# Patient Record
Sex: MALE | Race: WHITE | NOT HISPANIC OR LATINO | Employment: OTHER | URBAN - METROPOLITAN AREA
[De-identification: names, ages, dates, MRNs, and addresses within clinical notes are randomized per-mention and may not be internally consistent; named-entity substitution may affect disease eponyms.]

---

## 2023-04-19 ENCOUNTER — OFFICE VISIT (OUTPATIENT)
Dept: PRIMARY CARE | Facility: CLINIC | Age: 63
End: 2023-04-19
Payer: COMMERCIAL

## 2023-04-19 VITALS
DIASTOLIC BLOOD PRESSURE: 99 MMHG | OXYGEN SATURATION: 95 % | SYSTOLIC BLOOD PRESSURE: 169 MMHG | HEART RATE: 72 BPM | WEIGHT: 247.4 LBS | BODY MASS INDEX: 34.51 KG/M2

## 2023-04-19 DIAGNOSIS — I10 HYPERTENSION, UNSPECIFIED TYPE: ICD-10-CM

## 2023-04-19 DIAGNOSIS — E78.5 HYPERLIPIDEMIA, UNSPECIFIED HYPERLIPIDEMIA TYPE: ICD-10-CM

## 2023-04-19 DIAGNOSIS — E55.9 VITAMIN D DEFICIENCY: ICD-10-CM

## 2023-04-19 DIAGNOSIS — E11.9 TYPE 2 DIABETES MELLITUS WITHOUT COMPLICATION, WITHOUT LONG-TERM CURRENT USE OF INSULIN (MULTI): Primary | ICD-10-CM

## 2023-04-19 DIAGNOSIS — N18.30 STAGE 3 CHRONIC KIDNEY DISEASE, UNSPECIFIED WHETHER STAGE 3A OR 3B CKD (MULTI): ICD-10-CM

## 2023-04-19 PROCEDURE — 3080F DIAST BP >= 90 MM HG: CPT | Performed by: INTERNAL MEDICINE

## 2023-04-19 PROCEDURE — 3077F SYST BP >= 140 MM HG: CPT | Performed by: INTERNAL MEDICINE

## 2023-04-19 PROCEDURE — 99214 OFFICE O/P EST MOD 30 MIN: CPT | Performed by: INTERNAL MEDICINE

## 2023-04-19 PROCEDURE — 4010F ACE/ARB THERAPY RXD/TAKEN: CPT | Performed by: INTERNAL MEDICINE

## 2023-04-19 RX ORDER — LISINOPRIL 40 MG/1
40 TABLET ORAL DAILY
Qty: 90 TABLET | Refills: 3 | Status: SHIPPED | OUTPATIENT
Start: 2023-04-19 | End: 2023-10-09 | Stop reason: SDUPTHER

## 2023-04-19 RX ORDER — METOPROLOL TARTRATE 50 MG/1
TABLET ORAL
Qty: 135 TABLET | Refills: 3 | Status: SHIPPED | OUTPATIENT
Start: 2023-04-19 | End: 2023-09-25 | Stop reason: SDUPTHER

## 2023-04-19 RX ORDER — GLIPIZIDE 5 MG/1
5 TABLET, FILM COATED, EXTENDED RELEASE ORAL
Qty: 90 TABLET | Refills: 3 | Status: SHIPPED | OUTPATIENT
Start: 2023-04-19 | End: 2023-10-09 | Stop reason: SDUPTHER

## 2023-04-19 RX ORDER — OMEPRAZOLE 40 MG/1
1 CAPSULE, DELAYED RELEASE ORAL DAILY
COMMUNITY
Start: 2015-11-19 | End: 2023-09-25 | Stop reason: SDUPTHER

## 2023-04-19 RX ORDER — ROSUVASTATIN CALCIUM 10 MG/1
10 TABLET, COATED ORAL DAILY
COMMUNITY
Start: 2016-11-22 | End: 2023-05-22 | Stop reason: SDUPTHER

## 2023-04-19 RX ORDER — METFORMIN HYDROCHLORIDE 1000 MG/1
1000 TABLET ORAL
COMMUNITY
Start: 2015-11-19 | End: 2023-05-22 | Stop reason: SDUPTHER

## 2023-04-19 RX ORDER — GLIPIZIDE 5 MG/1
5 TABLET, FILM COATED, EXTENDED RELEASE ORAL
COMMUNITY
Start: 2020-12-07 | End: 2023-04-19 | Stop reason: SDUPTHER

## 2023-04-19 RX ORDER — GLIPIZIDE 5 MG/1
5 TABLET, FILM COATED, EXTENDED RELEASE ORAL
Qty: 90 TABLET | Refills: 3 | Status: SHIPPED | OUTPATIENT
Start: 2023-04-19 | End: 2023-04-19 | Stop reason: SDUPTHER

## 2023-04-19 RX ORDER — DOXAZOSIN 2 MG/1
2 TABLET ORAL DAILY
Qty: 90 TABLET | Refills: 1 | Status: SHIPPED | OUTPATIENT
Start: 2023-04-19 | End: 2023-10-09 | Stop reason: SDUPTHER

## 2023-04-19 RX ORDER — METOPROLOL TARTRATE 50 MG/1
TABLET ORAL
Qty: 135 TABLET | Refills: 3 | Status: SHIPPED | OUTPATIENT
Start: 2023-04-19 | End: 2023-04-19 | Stop reason: SDUPTHER

## 2023-04-19 RX ORDER — LISINOPRIL 40 MG/1
40 TABLET ORAL DAILY
Qty: 90 TABLET | Refills: 3 | Status: SHIPPED | OUTPATIENT
Start: 2023-04-19 | End: 2023-04-19 | Stop reason: SDUPTHER

## 2023-04-19 NOTE — PROGRESS NOTES
Subjective   Patient ID: Luc Dietz is a 62 y.o. male who presents for the following    Assessment/Plan   HTN: added doxazosin today 2mg patient may increase to 4mg if not at goal of 130/80 this weekend  Continue on lisinopril 40mg daily   Continue on metoprolol 50mg bid    dm2: stable, a1c 6.5, glipizide 5mg XL daily (stop jardiance due price) and continue on metformin 1000mgbid.      CKD stage 3: avoid nsaids, stay hydrated. Cr. 1.6 typically stable (once patient is on medicare consider farxiga)      HLD: LDL < 70. check lipid panel      Order Cologuard 2021 negative,     PSA is in the spring with me     HPI  male with type 2 diabetes, diabetic nephropathy, hx kidney stones followed by Dr. Spencer, hypertension, hyperlipidemia, nephrolithiasis, renal insufficiency comes for the following      Follow Up     HTN: patient denies any headaches, blurred vision or dizziness. patient denies any stroke like symptoms     HLD: Patient denies any muscle aches and is tolerating statin therapy     Diabetes Type 2: patient denies any low blood sugars. Patient is following with opthalmology on a yearly basis. 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 speech is clear and coherent  HEENT: Sclera and conjunctiva white, EOMI, uvela midline, no mouth lesions. PERRLA,      Respiratory : No respiratory distress, normal respiratory rhythm and effort. Clear bilateral breath sounds. No wheezing or rhonchi.   Cardiovascular: heart rate regular, S1, S2. no murmurs. no Lower extremity edema  Skin inspection: Normal skin color and pigmentation,  normal skin turgor and no visible rash, induration, or cellulitis  MSK: 5/5 strength upper and lower extremities without gait abnormalities. no loss of muscle mass   Neuro: 2-12 CN grossly intact.  no slurred speech. no lateralizing deficit  Psychiatric Orientation: Oriented to person, place, and time. no depression, homicidal or suicidal thoughts, normal affect     REVIEW OF SYSTEMS   Constitutional: not feeling tired and no fever, chills or sweats. Denies weight loss    HEENT: no earache and no sore throat. no blurred vision and or double vision. no headache  Cardiovascular: no exertional chest pain, no palpitations, no lower extremity edema and no intermittent leg claudication.   Lungs: Denies shortness of breath, exertional dyspnea, wheezing  Gastrointestinal: no change in bowel habits, no diarrhea, no nausea, no vomiting and no abdominal pain. Denies Melena, brbpr or dark stool  Musculoskeletal: no myalgias, no muscle weakness and no limb swelling.   Skin: no rashes, no change in skin color and pigmentation and no skin lumps.   Neurological: no headaches, no seizures, no numbness, no lateralizing deficits and no fainting.   Psychiatric: no depression and no anxiety.   Urine: denies polyuria, hematuria, dysuria   Endocrine: no cold intolerance, no heat intolerance      No Known Allergies    No current outpatient medications on file.     No current facility-administered medications for this visit.       Objective     No visits with results within 4 Month(s) from this visit.   Latest known visit with results is:   Legacy Encounter on 12/21/2020   Component Date Value Ref Range Status    Glucose 12/21/2020 172 (H)  74 - 99 mg/dL Final    Sodium 12/21/2020 141  136 - 145 mmol/L Final    Potassium 12/21/2020 5.2  3.5 - 5.3 mmol/L Final    Chloride 12/21/2020 104  98 - 107 mmol/L Final    Bicarbonate 12/21/2020 25  21 - 32 mmol/L Final    Anion Gap 12/21/2020 17  10 - 20 mmol/L Final    Urea Nitrogen 12/21/2020 28 (H)   6 - 23 mg/dL Final    Creatinine 12/21/2020 1.65 (H)  0.50 - 1.30 mg/dL Final    GLOMERULAR FILTRATION RATE-NON AFR* 12/21/2020 42 (A)  >60 mL/min/1.73m2 Final    GLOMERULAR FILTRATION RATE-* 12/21/2020 51 (A)  >60 mL/min/1.73m2 Final    Calcium 12/21/2020 9.6  8.6 - 10.6 mg/dL Final       Radiology: Reviewed imaging in powerchart.  No results found.    No family history on file.  Social History     Socioeconomic History    Marital status:      Spouse name: Not on file    Number of children: Not on file    Years of education: Not on file    Highest education level: Not on file   Occupational History    Not on file   Tobacco Use    Smoking status: Not on file    Smokeless tobacco: Not on file   Vaping Use    Vaping status: Not on file   Substance and Sexual Activity    Alcohol use: Not on file    Drug use: Not on file    Sexual activity: Not on file   Other Topics Concern    Not on file   Social History Narrative    Not on file     Social Determinants of Health     Financial Resource Strain: Not on file   Food Insecurity: Not on file   Transportation Needs: Not on file   Physical Activity: Not on file   Stress: Not on file   Social Connections: Not on file   Intimate Partner Violence: Not on file   Housing Stability: Not on file     Past Medical History:   Diagnosis Date    Personal history of other endocrine, nutritional and metabolic disease 11/03/2013    History of hyperlipidemia    Personal history of other endocrine, nutritional and metabolic disease     History of diabetes mellitus    Personal history of urinary calculi     Personal history of renal calculi     Past Surgical History:   Procedure Laterality Date    LITHOTRIPSY  05/19/2014    Renal Lithotripsy    OTHER SURGICAL HISTORY  12/02/2019    Colonoscopy    OTHER SURGICAL HISTORY  05/19/2014    Surgery Scrotum       Charting was completed using voice recognition technology and may include unintended errors.

## 2023-05-22 DIAGNOSIS — E11.9 TYPE 2 DIABETES MELLITUS WITHOUT COMPLICATION, WITHOUT LONG-TERM CURRENT USE OF INSULIN (MULTI): ICD-10-CM

## 2023-05-22 DIAGNOSIS — E78.5 HYPERLIPIDEMIA, UNSPECIFIED HYPERLIPIDEMIA TYPE: ICD-10-CM

## 2023-05-22 RX ORDER — METFORMIN HYDROCHLORIDE 1000 MG/1
1000 TABLET ORAL
Qty: 180 TABLET | Refills: 1 | Status: SHIPPED | OUTPATIENT
Start: 2023-05-22 | End: 2023-09-25 | Stop reason: SDUPTHER

## 2023-05-22 RX ORDER — ROSUVASTATIN CALCIUM 10 MG/1
10 TABLET, COATED ORAL DAILY
Qty: 90 TABLET | Refills: 1 | Status: SHIPPED | OUTPATIENT
Start: 2023-05-22 | End: 2023-09-25 | Stop reason: SDUPTHER

## 2023-09-25 DIAGNOSIS — I10 HYPERTENSION, UNSPECIFIED TYPE: ICD-10-CM

## 2023-09-25 DIAGNOSIS — N18.30 STAGE 3 CHRONIC KIDNEY DISEASE, UNSPECIFIED WHETHER STAGE 3A OR 3B CKD (MULTI): ICD-10-CM

## 2023-09-25 DIAGNOSIS — K21.9 GASTROESOPHAGEAL REFLUX DISEASE, UNSPECIFIED WHETHER ESOPHAGITIS PRESENT: ICD-10-CM

## 2023-09-25 DIAGNOSIS — E78.5 HYPERLIPIDEMIA, UNSPECIFIED HYPERLIPIDEMIA TYPE: ICD-10-CM

## 2023-09-25 DIAGNOSIS — E11.9 TYPE 2 DIABETES MELLITUS WITHOUT COMPLICATION, WITHOUT LONG-TERM CURRENT USE OF INSULIN (MULTI): ICD-10-CM

## 2023-09-25 RX ORDER — METFORMIN HYDROCHLORIDE 1000 MG/1
1000 TABLET ORAL
Qty: 180 TABLET | Refills: 1 | Status: SHIPPED | OUTPATIENT
Start: 2023-09-25 | End: 2024-04-03 | Stop reason: SDUPTHER

## 2023-09-25 RX ORDER — OMEPRAZOLE 40 MG/1
40 CAPSULE, DELAYED RELEASE ORAL DAILY
Qty: 90 CAPSULE | Refills: 3 | Status: SHIPPED | OUTPATIENT
Start: 2023-09-25

## 2023-09-25 RX ORDER — METOPROLOL TARTRATE 50 MG/1
TABLET ORAL
Qty: 135 TABLET | Refills: 3 | Status: SHIPPED | OUTPATIENT
Start: 2023-09-25

## 2023-09-25 RX ORDER — ROSUVASTATIN CALCIUM 10 MG/1
10 TABLET, COATED ORAL DAILY
Qty: 90 TABLET | Refills: 1 | Status: SHIPPED | OUTPATIENT
Start: 2023-09-25 | End: 2024-04-03 | Stop reason: SDUPTHER

## 2023-10-09 ENCOUNTER — OFFICE VISIT (OUTPATIENT)
Dept: PRIMARY CARE | Facility: CLINIC | Age: 63
End: 2023-10-09
Payer: MEDICARE

## 2023-10-09 VITALS
HEIGHT: 71 IN | OXYGEN SATURATION: 96 % | BODY MASS INDEX: 36.68 KG/M2 | HEART RATE: 60 BPM | SYSTOLIC BLOOD PRESSURE: 144 MMHG | WEIGHT: 262 LBS | DIASTOLIC BLOOD PRESSURE: 82 MMHG

## 2023-10-09 DIAGNOSIS — E78.5 HYPERLIPIDEMIA, UNSPECIFIED HYPERLIPIDEMIA TYPE: ICD-10-CM

## 2023-10-09 DIAGNOSIS — Z00.00 WELLNESS EXAMINATION: Primary | ICD-10-CM

## 2023-10-09 DIAGNOSIS — E55.9 VITAMIN D DEFICIENCY: ICD-10-CM

## 2023-10-09 DIAGNOSIS — I10 HYPERTENSION, UNSPECIFIED TYPE: ICD-10-CM

## 2023-10-09 DIAGNOSIS — N18.30 STAGE 3 CHRONIC KIDNEY DISEASE, UNSPECIFIED WHETHER STAGE 3A OR 3B CKD (MULTI): ICD-10-CM

## 2023-10-09 DIAGNOSIS — E11.9 TYPE 2 DIABETES MELLITUS WITHOUT COMPLICATION, WITHOUT LONG-TERM CURRENT USE OF INSULIN (MULTI): ICD-10-CM

## 2023-10-09 PROCEDURE — 3079F DIAST BP 80-89 MM HG: CPT | Performed by: INTERNAL MEDICINE

## 2023-10-09 PROCEDURE — 1160F RVW MEDS BY RX/DR IN RCRD: CPT | Performed by: INTERNAL MEDICINE

## 2023-10-09 PROCEDURE — 1159F MED LIST DOCD IN RCRD: CPT | Performed by: INTERNAL MEDICINE

## 2023-10-09 PROCEDURE — 1036F TOBACCO NON-USER: CPT | Performed by: INTERNAL MEDICINE

## 2023-10-09 PROCEDURE — 1170F FXNL STATUS ASSESSED: CPT | Performed by: INTERNAL MEDICINE

## 2023-10-09 PROCEDURE — 4010F ACE/ARB THERAPY RXD/TAKEN: CPT | Performed by: INTERNAL MEDICINE

## 2023-10-09 PROCEDURE — 99214 OFFICE O/P EST MOD 30 MIN: CPT | Performed by: INTERNAL MEDICINE

## 2023-10-09 PROCEDURE — 3077F SYST BP >= 140 MM HG: CPT | Performed by: INTERNAL MEDICINE

## 2023-10-09 PROCEDURE — G0402 INITIAL PREVENTIVE EXAM: HCPCS | Performed by: INTERNAL MEDICINE

## 2023-10-09 RX ORDER — LISINOPRIL 40 MG/1
40 TABLET ORAL DAILY
Qty: 90 TABLET | Refills: 3 | Status: SHIPPED | OUTPATIENT
Start: 2023-10-09 | End: 2024-10-08

## 2023-10-09 RX ORDER — GLIPIZIDE 5 MG/1
5 TABLET, FILM COATED, EXTENDED RELEASE ORAL
Qty: 90 TABLET | Refills: 3 | OUTPATIENT
Start: 2023-10-09

## 2023-10-09 RX ORDER — GLIPIZIDE 5 MG/1
5 TABLET, FILM COATED, EXTENDED RELEASE ORAL
Qty: 90 TABLET | Refills: 3 | Status: SHIPPED | OUTPATIENT
Start: 2023-10-09

## 2023-10-09 RX ORDER — DOXAZOSIN 2 MG/1
2 TABLET ORAL DAILY
Qty: 90 TABLET | Refills: 3 | Status: SHIPPED | OUTPATIENT
Start: 2023-10-09

## 2023-10-09 ASSESSMENT — ACTIVITIES OF DAILY LIVING (ADL)
DRESSING: INDEPENDENT
GROCERY_SHOPPING: INDEPENDENT
DOING_HOUSEWORK: INDEPENDENT
BATHING: INDEPENDENT
TAKING_MEDICATION: INDEPENDENT
MANAGING_FINANCES: INDEPENDENT

## 2023-10-09 ASSESSMENT — PATIENT HEALTH QUESTIONNAIRE - PHQ9
2. FEELING DOWN, DEPRESSED OR HOPELESS: NOT AT ALL
SUM OF ALL RESPONSES TO PHQ9 QUESTIONS 1 AND 2: 0
1. LITTLE INTEREST OR PLEASURE IN DOING THINGS: NOT AT ALL

## 2023-10-09 ASSESSMENT — VISUAL ACUITY
OS_CC: 20/20
OD_CC: 20/25

## 2023-11-28 ENCOUNTER — APPOINTMENT (OUTPATIENT)
Dept: PRIMARY CARE | Facility: CLINIC | Age: 63
End: 2023-11-28
Payer: MEDICARE

## 2024-03-29 ENCOUNTER — APPOINTMENT (OUTPATIENT)
Dept: PRIMARY CARE | Facility: CLINIC | Age: 64
End: 2024-03-29
Payer: MEDICARE

## 2024-04-03 ENCOUNTER — OFFICE VISIT (OUTPATIENT)
Dept: PRIMARY CARE | Facility: CLINIC | Age: 64
End: 2024-04-03
Payer: MEDICARE

## 2024-04-03 VITALS
DIASTOLIC BLOOD PRESSURE: 110 MMHG | SYSTOLIC BLOOD PRESSURE: 167 MMHG | HEART RATE: 85 BPM | HEIGHT: 71 IN | BODY MASS INDEX: 35.28 KG/M2 | WEIGHT: 252 LBS | OXYGEN SATURATION: 92 %

## 2024-04-03 DIAGNOSIS — E78.5 HYPERLIPIDEMIA, UNSPECIFIED HYPERLIPIDEMIA TYPE: ICD-10-CM

## 2024-04-03 DIAGNOSIS — M54.32 SCIATICA OF LEFT SIDE: Primary | ICD-10-CM

## 2024-04-03 DIAGNOSIS — N18.30 STAGE 3 CHRONIC KIDNEY DISEASE, UNSPECIFIED WHETHER STAGE 3A OR 3B CKD (MULTI): ICD-10-CM

## 2024-04-03 DIAGNOSIS — I10 HYPERTENSION, UNSPECIFIED TYPE: ICD-10-CM

## 2024-04-03 DIAGNOSIS — E11.9 TYPE 2 DIABETES MELLITUS WITHOUT COMPLICATION, WITHOUT LONG-TERM CURRENT USE OF INSULIN (MULTI): ICD-10-CM

## 2024-04-03 PROCEDURE — 3080F DIAST BP >= 90 MM HG: CPT | Performed by: INTERNAL MEDICINE

## 2024-04-03 PROCEDURE — 3077F SYST BP >= 140 MM HG: CPT | Performed by: INTERNAL MEDICINE

## 2024-04-03 PROCEDURE — 99214 OFFICE O/P EST MOD 30 MIN: CPT | Performed by: INTERNAL MEDICINE

## 2024-04-03 PROCEDURE — 1159F MED LIST DOCD IN RCRD: CPT | Performed by: INTERNAL MEDICINE

## 2024-04-03 PROCEDURE — 4010F ACE/ARB THERAPY RXD/TAKEN: CPT | Performed by: INTERNAL MEDICINE

## 2024-04-03 RX ORDER — ROSUVASTATIN CALCIUM 10 MG/1
10 TABLET, COATED ORAL DAILY
Qty: 90 TABLET | Refills: 3 | Status: SHIPPED | OUTPATIENT
Start: 2024-04-03

## 2024-04-03 RX ORDER — METHYLPREDNISOLONE 4 MG/1
TABLET ORAL
Qty: 21 TABLET | Refills: 0 | Status: SHIPPED | OUTPATIENT
Start: 2024-04-03 | End: 2024-04-10

## 2024-04-03 RX ORDER — ASPIRIN 81 MG/1
81 TABLET ORAL AS NEEDED
COMMUNITY

## 2024-04-03 RX ORDER — METFORMIN HYDROCHLORIDE 1000 MG/1
1000 TABLET ORAL
Qty: 180 TABLET | Refills: 3 | Status: SHIPPED | OUTPATIENT
Start: 2024-04-03

## 2024-04-03 NOTE — PROGRESS NOTES
"Subjective   Patient ID: Luc Dietz is a 63 y.o. male who presents for the following  Chronic care management    Initial medicare wellness 10/9/23    Go[facility]   Assessment/Plan   Left sided sciatica: medrol dose pack     HTN: stable,   Doxazosin 2mg  at goal of 130/80   Continue on lisinopril 40mg daily   Continue on metoprolol 50mg bid    dm2: stable, a1c 6.5--> 6.9 2023 --> 7.1 glipizide 5mg XL daily (stop jardiance due price) and continue on metformin 1000mgbid.      CKD stage 3: avoid nsaids, stay hydrated. Cr. 1.6 typically stable (once patient is on medicare consider farxiga)      HLD: LDL < 70. check lipid panel      Order Cologuard 2021 negative,     PSA i0.47    HPI  male with type 2 diabetes, diabetic nephropathy, hx kidney stones followed by Dr. Spencer, hypertension, hyperlipidemia, nephrolithiasis, renal insufficiency comes for the following      Follow Up    Left sciatica: patient has a large wallet in the back pocket. He says the pain shoots down his leg just above his knee. He denies any low back pain issues in the past or currently.      HTN: patient denies any headaches, blurred vision or dizziness. patient denies any stroke like symptoms     HLD: Patient denies any muscle aches and is tolerating statin therapy     Diabetes Type 2: patient denies any low blood sugars. Patient is following with opthalmology on a yearly basis. 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 speech is clear and coherent  HEENT: " Sclera and conjunctiva white, EOMI, uvela midline, no mouth lesions. PERRLA,      Respiratory : No respiratory distress, normal respiratory rhythm and effort. Clear bilateral breath sounds. No wheezing or rhonchi.   Cardiovascular: heart rate regular, S1, S2. no murmurs. no Lower extremity edema   MSK: 5/5 strength upper and lower extremities without gait abnormalities. no loss of muscle mass . Straight leg raise wnl. Rosalio test wnl. Internal external rotation of the left hip wnl. No central spine pain or pain in the lateral aspects of the muscles. No pain in the SI joints. (Notable very large wallet in the left back pocket where he complains of the pain originating from)  Neuro: 2-12 CN grossly intact.  no slurred speech. no lateralizing deficit  Psychiatric Orientation: Oriented to person, place, and time. no depression, homicidal or suicidal thoughts, normal affect     REVIEW OF SYSTEMS   Constitutional: not feeling tired and no fever, chills or sweats. Denies weight loss    HEENT: no earache and no sore throat. no blurred vision and or double vision. no headache  Cardiovascular: no exertional chest pain, no palpitations, no lower extremity edema and no intermittent leg claudication.   Lungs: Denies shortness of breath, exertional dyspnea, wheezing  Gastrointestinal: no change in bowel habits, no diarrhea, no nausea, no vomiting and no abdominal pain. Denies Melena, brbpr or dark stool  Musculoskeletal: no myalgias, no muscle weakness and no limb swelling.   Skin: no rashes, no change in skin color and pigmentation and no skin lumps.   Neurological: no headaches, no seizures, no numbness, no lateralizing deficits and no fainting.   Psychiatric: no depression and no anxiety.   Urine: denies polyuria, hematuria, dysuria   Endocrine: no cold intolerance, no heat intolerance      No Known Allergies    Current Outpatient Medications   Medication Sig Dispense Refill    aspirin 81 mg EC tablet Take 1 tablet (81 mg) by  mouth if needed.      doxazosin (Cardura) 2 mg tablet Take 1 tablet (2 mg) by mouth once daily. 90 tablet 3    glipiZIDE XL (Glucotrol XL) 5 mg 24 hr tablet Take 1 tablet (5 mg) by mouth once daily with a meal. 90 tablet 3    lisinopril 40 mg tablet Take 1 tablet (40 mg) by mouth once daily. 90 tablet 3    metFORMIN (Glucophage) 1,000 mg tablet Take 1 tablet (1,000 mg) by mouth 2 times a day with meals. 180 tablet 1    metoprolol tartrate (Lopressor) 50 mg tablet 1/2 tab in am 1 tab in pm 135 tablet 3    omeprazole (PriLOSEC) 40 mg DR capsule Take 1 capsule (40 mg) by mouth once daily. 90 capsule 3    rosuvastatin (Crestor) 10 mg tablet Take 1 tablet (10 mg) by mouth once daily. 90 tablet 1     No current facility-administered medications for this visit.       Objective     No visits with results within 4 Month(s) from this visit.   Latest known visit with results is:   Legacy Encounter on 12/21/2020   Component Date Value Ref Range Status    Glucose 12/21/2020 172 (H)  74 - 99 mg/dL Final    Sodium 12/21/2020 141  136 - 145 mmol/L Final    Potassium 12/21/2020 5.2  3.5 - 5.3 mmol/L Final    Chloride 12/21/2020 104  98 - 107 mmol/L Final    Bicarbonate 12/21/2020 25  21 - 32 mmol/L Final    Anion Gap 12/21/2020 17  10 - 20 mmol/L Final    Urea Nitrogen 12/21/2020 28 (H)  6 - 23 mg/dL Final    Creatinine 12/21/2020 1.65 (H)  0.50 - 1.30 mg/dL Final    GLOMERULAR FILTRATION RATE-NON AFR* 12/21/2020 42 (A)  >60 mL/min/1.73m2 Final    GLOMERULAR FILTRATION RATE-* 12/21/2020 51 (A)  >60 mL/min/1.73m2 Final    Calcium 12/21/2020 9.6  8.6 - 10.6 mg/dL Final       Radiology: Reviewed imaging in powerchart.  No results found.    No family history on file.  Social History     Socioeconomic History    Marital status:      Spouse name: None    Number of children: None    Years of education: None    Highest education level: None   Occupational History    None   Tobacco Use    Smoking status: Never    Smokeless  tobacco: Never   Substance and Sexual Activity    Alcohol use: Yes     Comment: rarely    Drug use: Never    Sexual activity: None   Other Topics Concern    None   Social History Narrative    None     Social Determinants of Health     Financial Resource Strain: Not on file   Food Insecurity: Not on file   Transportation Needs: Not on file   Physical Activity: Not on file   Stress: Not on file   Social Connections: Not on file   Intimate Partner Violence: Not on file   Housing Stability: Not on file     Past Medical History:   Diagnosis Date    Personal history of other endocrine, nutritional and metabolic disease 11/03/2013    History of hyperlipidemia    Personal history of other endocrine, nutritional and metabolic disease     History of diabetes mellitus    Personal history of urinary calculi     Personal history of renal calculi     Past Surgical History:   Procedure Laterality Date    LITHOTRIPSY  05/19/2014    Renal Lithotripsy    OTHER SURGICAL HISTORY  12/02/2019    Colonoscopy    OTHER SURGICAL HISTORY  05/19/2014    Surgery Scrotum       Charting was completed using voice recognition technology and may include unintended errors.

## 2024-04-24 ENCOUNTER — TELEPHONE (OUTPATIENT)
Dept: PRIMARY CARE | Facility: CLINIC | Age: 64
End: 2024-04-24
Payer: MEDICARE

## 2024-04-24 DIAGNOSIS — M54.32 SCIATICA OF LEFT SIDE: ICD-10-CM

## 2024-04-26 DIAGNOSIS — M54.32 SCIATICA OF LEFT SIDE: Primary | ICD-10-CM

## 2024-04-26 RX ORDER — METHYLPREDNISOLONE 4 MG/1
TABLET ORAL
Qty: 21 TABLET | Refills: 0 | Status: SHIPPED | OUTPATIENT
Start: 2024-04-26 | End: 2024-05-03

## 2024-04-26 RX ORDER — GABAPENTIN 100 MG/1
100 CAPSULE ORAL 3 TIMES DAILY PRN
Qty: 30 CAPSULE | Refills: 0 | Status: SHIPPED | OUTPATIENT
Start: 2024-04-26 | End: 2024-05-06 | Stop reason: SDUPTHER

## 2024-04-26 RX ORDER — GABAPENTIN 100 MG/1
100 CAPSULE ORAL 3 TIMES DAILY PRN
Qty: 30 CAPSULE | Refills: 0 | Status: SHIPPED | OUTPATIENT
Start: 2024-04-26 | End: 2024-04-26 | Stop reason: SDUPTHER

## 2024-04-26 RX ORDER — METHYLPREDNISOLONE 4 MG/1
TABLET ORAL
Qty: 21 TABLET | Refills: 0 | Status: SHIPPED | OUTPATIENT
Start: 2024-04-26 | End: 2024-04-26 | Stop reason: SDUPTHER

## 2024-04-26 NOTE — TELEPHONE ENCOUNTER
Patient wife calling back - tylenol is not working. Pain is getting worse, going into both legs. He can't stand for longer than 5 minutes. They are requesting recommendations before end of day due to pain worsening.

## 2024-05-06 ENCOUNTER — TELEPHONE (OUTPATIENT)
Dept: PRIMARY CARE | Facility: CLINIC | Age: 64
End: 2024-05-06
Payer: MEDICARE

## 2024-05-06 DIAGNOSIS — M54.32 SCIATICA OF LEFT SIDE: ICD-10-CM

## 2024-05-06 RX ORDER — GABAPENTIN 100 MG/1
100 CAPSULE ORAL 3 TIMES DAILY PRN
Qty: 90 CAPSULE | Refills: 1 | Status: SHIPPED | OUTPATIENT
Start: 2024-05-06 | End: 2024-11-02

## 2024-05-06 NOTE — TELEPHONE ENCOUNTER
He started medication on April 26th - Medrol Dospak 4 mg & gabapentin 100 mg    He took his last gabapentin 100 mg.    The physical Therapist stated if he felt pain to call Dr back and get anther script or suggestion from .  He took his las pill today.

## 2024-05-07 ENCOUNTER — TELEPHONE (OUTPATIENT)
Dept: PRIMARY CARE | Facility: CLINIC | Age: 64
End: 2024-05-07
Payer: MEDICARE

## 2024-05-07 NOTE — TELEPHONE ENCOUNTER
Not sure what to do.  The pain is horrible and PT doesn't want to go to ER or hospital.        Last note:  Because you ordered Rich’s prescription yesterday he didn’t miss any. I don’t know why but the Gabapentin and Tylenol are no longer working! He is in tremendous pain all day yesterday. 3:30 am screaming in pain. Pain number 10. He say’s worse than his kidney stones and his second one was really a bad experience. I don’t see him going to his therapy like this. We are probably going to end up in the emergency department in the morning.

## 2024-05-08 ENCOUNTER — OFFICE VISIT (OUTPATIENT)
Dept: PRIMARY CARE | Facility: CLINIC | Age: 64
End: 2024-05-08
Payer: MEDICARE

## 2024-05-08 VITALS
DIASTOLIC BLOOD PRESSURE: 101 MMHG | BODY MASS INDEX: 35.28 KG/M2 | WEIGHT: 252 LBS | OXYGEN SATURATION: 93 % | SYSTOLIC BLOOD PRESSURE: 169 MMHG | HEART RATE: 79 BPM | HEIGHT: 71 IN

## 2024-05-08 DIAGNOSIS — M54.32 SCIATICA OF LEFT SIDE: Primary | ICD-10-CM

## 2024-05-08 PROCEDURE — 3080F DIAST BP >= 90 MM HG: CPT | Performed by: INTERNAL MEDICINE

## 2024-05-08 PROCEDURE — 99213 OFFICE O/P EST LOW 20 MIN: CPT | Performed by: INTERNAL MEDICINE

## 2024-05-08 PROCEDURE — 1159F MED LIST DOCD IN RCRD: CPT | Performed by: INTERNAL MEDICINE

## 2024-05-08 PROCEDURE — 3077F SYST BP >= 140 MM HG: CPT | Performed by: INTERNAL MEDICINE

## 2024-05-08 PROCEDURE — 4010F ACE/ARB THERAPY RXD/TAKEN: CPT | Performed by: INTERNAL MEDICINE

## 2024-05-08 RX ORDER — PREDNISONE 5 MG/1
TABLET ORAL
Qty: 30 TABLET | Refills: 3 | Status: SHIPPED | OUTPATIENT
Start: 2024-05-08

## 2024-05-08 NOTE — PROGRESS NOTES
Subjective   Patient ID: Lokesh Dietz is a 63 y.o. male who presents for the following  Chronic care management    Initial medicare wellness 10/9/23    Go[facility]   Assessment/Plan   Left sided sciatica:   Prednisone taper 15mg x 5 days 10mg x 5 days 5mg x 5 dailys. Follow up with pain management  Please no activities that require the back to twist.   Xray of the low spine.   Physical therapy .     Other medical issues, see below     HTN: stable,   Doxazosin 2mg  at goal of 130/80   Continue on lisinopril 40mg daily   Continue on metoprolol 50mg bid    dm2: stable, a1c 6.5--> 6.9 9/2023 --> 7.1 glipizide 5mg XL daily (stop jardiance due price) and continue on metformin 1000mgbid.      CKD stage 3: avoid nsaids, stay hydrated. Cr. 1.6 typically stable (once patient is on medicare consider farxiga)      HLD: LDL < 70. check lipid panel      Order Cologuard September 2021 negative,     PSA i0.47    HPI  male with type 2 diabetes, diabetic nephropathy, hx kidney stones followed by Dr. Spencer, hypertension, hyperlipidemia, nephrolithiasis, renal insufficiency comes for the following      Follow Up    Left sciatica: patient has a large wallet in the back pocket. He says the pain shoots down his leg just above his knee. He denies any low back pain issues in the past or currently. Patient did well on medrol dose pack a week ago but his pain flaired up again. He says taht he felt great and went back to doing yard work again and now he s in a good amount of pain in the low back on the left (he points to SI joint)      HTN: patient denies any headaches, blurred vision or dizziness. patient denies any stroke like symptoms     HLD: Patient denies any muscle aches and is tolerating statin therapy     Diabetes Type 2: patient denies any low blood sugars. Patient is following with opthalmology on a yearly basis. doing well on glipizide 5mg XL daily     denies alcohol use, no smoking, no illegal drugs     surgery: calcinosis of  "the testicles with dr kennedy     family history: little brother  60s yo [place]     Colonoscopy: states last colonoscopy 10 years    Visit Vitals  BP (!) 169/101 (BP Location: Left arm, Patient Position: Sitting, BP Cuff Size: Adult)   Pulse 79   Ht 1.803 m (5' 11\")   Wt 114 kg (252 lb)   SpO2 93%   BMI 35.15 kg/m²   Smoking Status Never   BSA 2.39 m²     PHYSICAL EXAM   General appearance: Alert and in no acute distress. speech is clear and coherent  HEENT: Sclera and conjunctiva white, EOMI,     Respiratory : No respiratory distress, normal respiratory rhythm and effort. Clear bilateral breath sounds. No wheezing or rhonchi.   Cardiovascular: heart rate regular, S1, S2. no murmurs. no Lower extremity edema   MSK: 5/5 strength upper and lower extremities without gait abnormalities. no loss of muscle mass . Straight leg raise wnl. Rosalio test wnl. Internal external rotation of the left hip wnl. No central spine pain or pain in the lateral aspects of the muscles. Pain left SI joint   Neuro: 2-12 CN grossly intact.  no slurred speech. no lateralizing deficit     REVIEW OF SYSTEMS   Constitutional: not feeling tired and no fever, chills or sweats. Denies weight loss    HEENT: no earache and no sore throat. no blurred vision and or double vision. no headache  Cardiovascular: no exertional chest pain, no palpitations, no lower extremity edema and no intermittent leg claudication.   Lungs: Denies shortness of breath, exertional dyspnea, wheezing  Gastrointestinal: no change in bowel habits, no diarrhea, no nausea, no vomiting and no abdominal pain. Denies Melena, brbpr or dark stool  Musculoskeletal: no myalgias, no muscle weakness and no limb swelling.   Skin: no rashes, no change in skin color and pigmentation and no skin lumps.   Neurological: no headaches, no seizures, no numbness, no lateralizing deficits and no fainting.   Psychiatric: no depression and no anxiety.   Urine: denies polyuria, hematuria, dysuria "   Endocrine: no cold intolerance, no heat intolerance      No Known Allergies    Current Outpatient Medications   Medication Sig Dispense Refill    aspirin 81 mg EC tablet Take 1 tablet (81 mg) by mouth if needed.      doxazosin (Cardura) 2 mg tablet Take 1 tablet (2 mg) by mouth once daily. 90 tablet 3    gabapentin (Neurontin) 100 mg capsule Take 1 capsule (100 mg) by mouth 3 times a day as needed (pain). 90 capsule 1    glipiZIDE XL (Glucotrol XL) 5 mg 24 hr tablet Take 1 tablet (5 mg) by mouth once daily with a meal. 90 tablet 3    lisinopril 40 mg tablet Take 1 tablet (40 mg) by mouth once daily. 90 tablet 3    metFORMIN (Glucophage) 1,000 mg tablet Take 1 tablet (1,000 mg) by mouth 2 times a day with meals. 180 tablet 3    metoprolol tartrate (Lopressor) 50 mg tablet 1/2 tab in am 1 tab in pm 135 tablet 3    omeprazole (PriLOSEC) 40 mg DR capsule Take 1 capsule (40 mg) by mouth once daily. 90 capsule 3    rosuvastatin (Crestor) 10 mg tablet Take 1 tablet (10 mg) by mouth once daily. 90 tablet 3     No current facility-administered medications for this visit.       Objective     No visits with results within 4 Month(s) from this visit.   Latest known visit with results is:   Legacy Encounter on 12/21/2020   Component Date Value Ref Range Status    Glucose 12/21/2020 172 (H)  74 - 99 mg/dL Final    Sodium 12/21/2020 141  136 - 145 mmol/L Final    Potassium 12/21/2020 5.2  3.5 - 5.3 mmol/L Final    Chloride 12/21/2020 104  98 - 107 mmol/L Final    Bicarbonate 12/21/2020 25  21 - 32 mmol/L Final    Anion Gap 12/21/2020 17  10 - 20 mmol/L Final    Urea Nitrogen 12/21/2020 28 (H)  6 - 23 mg/dL Final    Creatinine 12/21/2020 1.65 (H)  0.50 - 1.30 mg/dL Final    GLOMERULAR FILTRATION RATE-NON AFR* 12/21/2020 42 (A)  >60 mL/min/1.73m2 Final    GLOMERULAR FILTRATION RATE-* 12/21/2020 51 (A)  >60 mL/min/1.73m2 Final    Calcium 12/21/2020 9.6  8.6 - 10.6 mg/dL Final       Radiology: Reviewed imaging in  powerchart.  No results found.    No family history on file.  Social History     Socioeconomic History    Marital status:      Spouse name: Not on file    Number of children: Not on file    Years of education: Not on file    Highest education level: Not on file   Occupational History    Not on file   Tobacco Use    Smoking status: Never    Smokeless tobacco: Never   Substance and Sexual Activity    Alcohol use: Yes     Comment: rarely    Drug use: Never    Sexual activity: Not on file   Other Topics Concern    Not on file   Social History Narrative    Not on file     Social Determinants of Health     Financial Resource Strain: Not on file   Food Insecurity: Not on file   Transportation Needs: Not on file   Physical Activity: Not on file   Stress: Not on file   Social Connections: Not on file   Intimate Partner Violence: Not on file   Housing Stability: Not on file     Past Medical History:   Diagnosis Date    Personal history of other endocrine, nutritional and metabolic disease 11/03/2013    History of hyperlipidemia    Personal history of other endocrine, nutritional and metabolic disease     History of diabetes mellitus    Personal history of urinary calculi     Personal history of renal calculi     Past Surgical History:   Procedure Laterality Date    LITHOTRIPSY  05/19/2014    Renal Lithotripsy    OTHER SURGICAL HISTORY  12/02/2019    Colonoscopy    OTHER SURGICAL HISTORY  05/19/2014    Surgery Scrotum       Charting was completed using voice recognition technology and may include unintended errors.

## 2024-05-10 DIAGNOSIS — M54.32 SCIATICA OF LEFT SIDE: ICD-10-CM

## 2024-05-14 ENCOUNTER — TELEPHONE (OUTPATIENT)
Dept: PRIMARY CARE | Facility: CLINIC | Age: 64
End: 2024-05-14
Payer: MEDICARE

## 2024-05-14 NOTE — TELEPHONE ENCOUNTER
----- Message from Job Rosa DO sent at 5/13/2024  5:01 PM EDT -----  No acute findings. Lots of arthritis though

## 2024-05-20 DIAGNOSIS — M54.32 SCIATICA OF LEFT SIDE: ICD-10-CM

## 2024-06-03 DIAGNOSIS — Z80.42 FAMILY HISTORY OF PROSTATE CANCER: ICD-10-CM

## 2024-06-10 ENCOUNTER — TELEPHONE (OUTPATIENT)
Dept: PRIMARY CARE | Facility: CLINIC | Age: 64
End: 2024-06-10
Payer: MEDICARE

## 2024-06-10 NOTE — TELEPHONE ENCOUNTER
PT needs the PSA that was provided on 10/9/23 to mention that there is family cancer history for insurance to cover this.      Please have order updated if possible and sent to PT so she can send in to her insurance.    Call and let her know.

## 2024-06-13 NOTE — TELEPHONE ENCOUNTER
----- Message from Luc Dietz sent at 6/13/2024  2:08 PM EDT -----  Regarding: PSA test  Contact: [phone]  Hi, sorry to bother you about this.  I’ve called your office twice now about this problem.  Medicare denied the claim for Lokesh’s P S A test.  I called the to ask why since his father had Prostrate Cancer.  They said it should have been paid as preventative since there is family history.  So I need to send in an appeal. To do this I need a copy of the original order which you gave him at his 10/09/2023 appointment. We also need the correct code for family history of prostrate cancer. If that isn’t the one on the original order.  Both times I called I was told I would get a call back but I haven’t. I don’t want to have to pay this bill.  Thank you for your help.  Sujatha Dietz

## 2024-06-13 NOTE — TELEPHONE ENCOUNTER
LVMTCB,WHO ARE WE FAXING THE ORDER TO, IS PT COMING TO PICK IT UP. AND DO THEY NEED A NEW PSA ORDER

## 2024-06-19 DIAGNOSIS — B35.9 TINEA: Primary | ICD-10-CM

## 2024-06-19 RX ORDER — FLUCONAZOLE 150 MG/1
150 TABLET ORAL
Qty: 6 TABLET | Refills: 0 | Status: SHIPPED | OUTPATIENT
Start: 2024-06-23

## 2024-06-26 DIAGNOSIS — B35.9 TINEA: ICD-10-CM

## 2024-06-26 RX ORDER — FLUCONAZOLE 150 MG/1
150 TABLET ORAL
Qty: 4 TABLET | Refills: 0 | Status: SHIPPED | OUTPATIENT
Start: 2024-06-30

## 2024-06-26 RX ORDER — NYSTATIN 100000 [USP'U]/G
1 POWDER TOPICAL 2 TIMES DAILY
Qty: 30 G | Refills: 2 | Status: SHIPPED | OUTPATIENT
Start: 2024-06-26 | End: 2025-06-26

## 2024-09-17 ENCOUNTER — TELEPHONE (OUTPATIENT)
Dept: PRIMARY CARE | Facility: CLINIC | Age: 64
End: 2024-09-17
Payer: MEDICARE

## 2024-09-17 DIAGNOSIS — N18.30 STAGE 3 CHRONIC KIDNEY DISEASE, UNSPECIFIED WHETHER STAGE 3A OR 3B CKD (MULTI): ICD-10-CM

## 2024-09-17 DIAGNOSIS — I10 HYPERTENSION, UNSPECIFIED TYPE: ICD-10-CM

## 2024-09-17 DIAGNOSIS — K21.9 GASTROESOPHAGEAL REFLUX DISEASE, UNSPECIFIED WHETHER ESOPHAGITIS PRESENT: ICD-10-CM

## 2024-09-17 RX ORDER — METOPROLOL TARTRATE 50 MG/1
TABLET ORAL
Qty: 135 TABLET | Refills: 3 | Status: SHIPPED | OUTPATIENT
Start: 2024-09-17

## 2024-09-17 RX ORDER — DOXAZOSIN 2 MG/1
2 TABLET ORAL DAILY
Qty: 90 TABLET | Refills: 3 | Status: SHIPPED | OUTPATIENT
Start: 2024-09-17

## 2024-09-17 RX ORDER — OMEPRAZOLE 40 MG/1
40 CAPSULE, DELAYED RELEASE ORAL DAILY
Qty: 90 CAPSULE | Refills: 3 | Status: SHIPPED | OUTPATIENT
Start: 2024-09-17

## 2024-09-30 ENCOUNTER — APPOINTMENT (OUTPATIENT)
Dept: PRIMARY CARE | Facility: CLINIC | Age: 64
End: 2024-09-30
Payer: MEDICARE

## 2024-09-30 VITALS
DIASTOLIC BLOOD PRESSURE: 84 MMHG | HEART RATE: 78 BPM | WEIGHT: 262 LBS | BODY MASS INDEX: 36.68 KG/M2 | HEIGHT: 71 IN | OXYGEN SATURATION: 92 % | SYSTOLIC BLOOD PRESSURE: 139 MMHG

## 2024-09-30 DIAGNOSIS — E11.9 TYPE 2 DIABETES MELLITUS WITHOUT COMPLICATION, WITHOUT LONG-TERM CURRENT USE OF INSULIN (MULTI): ICD-10-CM

## 2024-09-30 DIAGNOSIS — Z12.11 SCREEN FOR COLON CANCER: ICD-10-CM

## 2024-09-30 DIAGNOSIS — I10 HYPERTENSION, UNSPECIFIED TYPE: ICD-10-CM

## 2024-09-30 DIAGNOSIS — E11.59 HYPERTENSION ASSOCIATED WITH DIABETES (MULTI): ICD-10-CM

## 2024-09-30 DIAGNOSIS — N18.30 STAGE 3 CHRONIC KIDNEY DISEASE, UNSPECIFIED WHETHER STAGE 3A OR 3B CKD (MULTI): ICD-10-CM

## 2024-09-30 DIAGNOSIS — L30.9 ECZEMA, UNSPECIFIED TYPE: Primary | ICD-10-CM

## 2024-09-30 DIAGNOSIS — I15.2 HYPERTENSION ASSOCIATED WITH DIABETES (MULTI): ICD-10-CM

## 2024-09-30 DIAGNOSIS — E66.01 OBESITY, MORBID (MULTI): ICD-10-CM

## 2024-09-30 DIAGNOSIS — M54.32 SCIATICA OF LEFT SIDE: ICD-10-CM

## 2024-09-30 PROCEDURE — 1036F TOBACCO NON-USER: CPT | Performed by: INTERNAL MEDICINE

## 2024-09-30 PROCEDURE — 3075F SYST BP GE 130 - 139MM HG: CPT | Performed by: INTERNAL MEDICINE

## 2024-09-30 PROCEDURE — G2211 COMPLEX E/M VISIT ADD ON: HCPCS | Performed by: INTERNAL MEDICINE

## 2024-09-30 PROCEDURE — 4010F ACE/ARB THERAPY RXD/TAKEN: CPT | Performed by: INTERNAL MEDICINE

## 2024-09-30 PROCEDURE — 3079F DIAST BP 80-89 MM HG: CPT | Performed by: INTERNAL MEDICINE

## 2024-09-30 PROCEDURE — 99214 OFFICE O/P EST MOD 30 MIN: CPT | Performed by: INTERNAL MEDICINE

## 2024-09-30 PROCEDURE — 1123F ACP DISCUSS/DSCN MKR DOCD: CPT | Performed by: INTERNAL MEDICINE

## 2024-09-30 PROCEDURE — 3008F BODY MASS INDEX DOCD: CPT | Performed by: INTERNAL MEDICINE

## 2024-09-30 PROCEDURE — 1159F MED LIST DOCD IN RCRD: CPT | Performed by: INTERNAL MEDICINE

## 2024-09-30 RX ORDER — GLIPIZIDE 5 MG/1
5 TABLET, FILM COATED, EXTENDED RELEASE ORAL
Qty: 90 TABLET | Refills: 3 | Status: SHIPPED | OUTPATIENT
Start: 2024-09-30

## 2024-09-30 RX ORDER — PREDNISONE 5 MG/1
TABLET ORAL
Qty: 30 TABLET | Refills: 3 | Status: SHIPPED | OUTPATIENT
Start: 2024-09-30

## 2024-09-30 RX ORDER — LISINOPRIL 40 MG/1
40 TABLET ORAL DAILY
Qty: 90 TABLET | Refills: 3 | Status: SHIPPED | OUTPATIENT
Start: 2024-09-30 | End: 2025-09-30

## 2024-09-30 RX ORDER — NYSTATIN AND TRIAMCINOLONE ACETONIDE 100000; 1 [USP'U]/G; MG/G
CREAM TOPICAL 2 TIMES DAILY
Qty: 60 G | Refills: 1 | Status: SHIPPED | OUTPATIENT
Start: 2024-09-30

## 2024-09-30 NOTE — PROGRESS NOTES
Subjective   Patient ID: Lokesh Dietz is a 64 y.o. male who presents for the following  Chronic care management    Initial medicare wellness 10/9/23    Goes [facility]opa Conn brother  Assessment/Plan   DJD lumbar spine/Left sided sciatica:   Prednisone taper 15mg x 5 days 10mg x 5 days 5mg x 5 dailys.  (Only to be used as needed. If patient is in 10/10 pain he should start off at 15mg, if 8/10 pain, if 5/10 pain he can start at 5mg   pain management, Dr Babb   200mg gabapentin tid       HTN: stable,   Doxazosin 2mg  at goal of 130/80   Continue on lisinopril 40mg daily   Continue on metoprolol 50mg bid    dm2: stable, a1c 6.5--> 6.9 9/2023 --> 7.1 --> 8.4 (9/24/24)  Likely combination of intermittent oral steroid use and diet   glipizide 5mg XL daily (stop jardiance due price) and continue on metformin 1000mgbid.     Morbid obesity: stable, diet and weight loss     CKD stage 3: avoid nsaids, stay hydrated. Cr. 1.6 typically stable (once patient is on medicare consider farxiga)      HLD: LDL < 70. check lipid panel      Order Cologuard September 2021 negative,     Father had prostate cancer     PSA 0.47    HPI  male with type 2 diabetes, diabetic nephropathy, hx kidney stones followed by Dr. Spencer, hypertension, hyperlipidemia, nephrolithiasis, renal insufficiency comes for the following      Follow Up    Right lower extremity: with eczema but has improved with nystatin still sizeable appears to be my flaky then anything else at the moment     Left sciatica: patient has a large wallet in the back pocket. He says the pain shoots down his leg just above his knee. He denies any low back pain issues in the past or currently. Patient did well on medrol dose pack a week ago but his pain flaired up again. He says taht he felt great and went back to doing yard work again and now he s in a good amount of pain in the low back on the left (he points to SI joint)      HTN: patient denies any headaches, blurred vision or  "dizziness. patient denies any stroke like symptoms     HLD: Patient denies any muscle aches and is tolerating statin therapy     Diabetes Type 2: patient denies any low blood sugars. Patient is following with opthalmology on a yearly basis. 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 speech is clear and coherent  HEENT: Sclera and conjunctiva white, EOMI,     Respiratory : No respiratory distress, normal respiratory rhythm and effort. Clear bilateral breath sounds. No wheezing or rhonchi.   Cardiovascular: heart rate regular, S1, S2. no murmurs. no Lower extremity edema   MSK: 5/5 strength upper and lower extremities without gait abnormalities. no loss of muscle mass . Straight leg raise wnl. Rosalio test wnl. Internal external rotation of the left hip wnl. No central spine pain or pain in the lateral aspects of the muscles. Pain left SI joint   Neuro: 2-12 CN grossly intact.  no slurred speech. no lateralizing deficit     REVIEW OF SYSTEMS   Constitutional: not feeling tired and no fever, chills or sweats. Denies weight loss    HEENT: no earache and no sore throat. no blurred vision and or double vision. no headache  Cardiovascular: no exertional chest pain, no palpitations, no lower extremity edema and no intermittent leg claudication.   Lungs: Denies shortness of breath, exertional dyspnea, wheezing  Gastrointestinal: no change in bowel habits, no diarrhea, no nausea, no vomiting and no abdominal pain. Denies Melena, brbpr or dark " stool  Musculoskeletal: no myalgias, no muscle weakness and no limb swelling.   Skin: no rashes, no change in skin color and pigmentation and no skin lumps.   Neurological: no headaches, no seizures, no numbness, no lateralizing deficits and no fainting.   Psychiatric: no depression and no anxiety.   Urine: denies polyuria, hematuria, dysuria   Endocrine: no cold intolerance, no heat intolerance      No Known Allergies    Current Outpatient Medications   Medication Sig Dispense Refill    aspirin 81 mg EC tablet Take 1 tablet (81 mg) by mouth if needed.      doxazosin (Cardura) 2 mg tablet Take 1 tablet (2 mg) by mouth once daily. 90 tablet 3    fluconazole (Diflucan) 150 mg tablet Take 1 tablet (150 mg) by mouth 1 (one) time per week. 4 tablet 0    gabapentin (Neurontin) 100 mg capsule Take 1 capsule (100 mg) by mouth 3 times a day as needed (pain). (Patient taking differently: Take 2 capsules (200 mg) by mouth 3 times a day.) 90 capsule 1    glipiZIDE XL (Glucotrol XL) 5 mg 24 hr tablet Take 1 tablet (5 mg) by mouth once daily with a meal. 90 tablet 3    lisinopril 40 mg tablet Take 1 tablet (40 mg) by mouth once daily. 90 tablet 3    metFORMIN (Glucophage) 1,000 mg tablet Take 1 tablet (1,000 mg) by mouth 2 times a day with meals. 180 tablet 3    metoprolol tartrate (Lopressor) 50 mg tablet 1/2 tab in am 1 tab in pm 135 tablet 3    nystatin (Mycostatin) 100,000 unit/gram powder Apply 1 Application topically 2 times a day. 30 g 2    omeprazole (PriLOSEC) 40 mg DR capsule Take 1 capsule (40 mg) by mouth once daily. 90 capsule 3    rosuvastatin (Crestor) 10 mg tablet Take 1 tablet (10 mg) by mouth once daily. 90 tablet 3    predniSONE (Deltasone) 5 mg tablet 15MG for 5 days 10mg for 5 days 5mg for 5 days (Patient not taking: Reported on 9/30/2024) 30 tablet 3     No current facility-administered medications for this visit.       Objective     No visits with results within 4 Month(s) from this visit.   Latest known  visit with results is:   Legacy Encounter on 12/21/2020   Component Date Value Ref Range Status    Glucose 12/21/2020 172 (H)  74 - 99 mg/dL Final    Sodium 12/21/2020 141  136 - 145 mmol/L Final    Potassium 12/21/2020 5.2  3.5 - 5.3 mmol/L Final    Chloride 12/21/2020 104  98 - 107 mmol/L Final    Bicarbonate 12/21/2020 25  21 - 32 mmol/L Final    Anion Gap 12/21/2020 17  10 - 20 mmol/L Final    Urea Nitrogen 12/21/2020 28 (H)  6 - 23 mg/dL Final    Creatinine 12/21/2020 1.65 (H)  0.50 - 1.30 mg/dL Final    GLOMERULAR FILTRATION RATE-NON AFR* 12/21/2020 42 (A)  >60 mL/min/1.73m2 Final    GLOMERULAR FILTRATION RATE-* 12/21/2020 51 (A)  >60 mL/min/1.73m2 Final    Calcium 12/21/2020 9.6  8.6 - 10.6 mg/dL Final       Radiology: Reviewed imaging in powerchart.  No results found.    No family history on file.  Social History     Socioeconomic History    Marital status:    Tobacco Use    Smoking status: Never    Smokeless tobacco: Never   Substance and Sexual Activity    Alcohol use: Yes     Comment: rarely    Drug use: Never     Past Medical History:   Diagnosis Date    Personal history of other endocrine, nutritional and metabolic disease 11/03/2013    History of hyperlipidemia    Personal history of other endocrine, nutritional and metabolic disease     History of diabetes mellitus    Personal history of urinary calculi     Personal history of renal calculi     Past Surgical History:   Procedure Laterality Date    LITHOTRIPSY  05/19/2014    Renal Lithotripsy    OTHER SURGICAL HISTORY  12/02/2019    Colonoscopy    OTHER SURGICAL HISTORY  05/19/2014    Surgery Scrotum       Charting was completed using voice recognition technology and may include unintended errors.

## 2024-10-01 ENCOUNTER — APPOINTMENT (OUTPATIENT)
Dept: PRIMARY CARE | Facility: CLINIC | Age: 64
End: 2024-10-01
Payer: MEDICARE

## 2024-10-23 LAB — NONINV COLON CA DNA+OCC BLD SCRN STL QL: NEGATIVE

## 2024-12-09 NOTE — PROGRESS NOTES
"Subjective   Patient ID: Luc Dietz is a 63 y.o. male who presents for the following    Initial medicare wellness 10/9/23 and the following.     Assessment/Plan   HTN: stable,   Doxazosin 2mg  at goal of 130/80   Continue on lisinopril 40mg daily   Continue on metoprolol 50mg bid    dm2: stable, a1c 6.5--> 6.9 2023 glipizide 5mg XL daily (stop jardiance due price) and continue on metformin 1000mgbid.      CKD stage 3: avoid nsaids, stay hydrated. Cr. 1.6 typically stable (once patient is on medicare consider farxiga)      HLD: LDL < 70. check lipid panel      Order Cologuard 2021 negative,     PSA is in the spring with me     HPI  male with type 2 diabetes, diabetic nephropathy, hx kidney stones followed by Dr. Spencer, hypertension, hyperlipidemia, nephrolithiasis, renal insufficiency comes for the following      Follow Up     HTN: patient denies any headaches, blurred vision or dizziness. patient denies any stroke like symptoms     HLD: Patient denies any muscle aches and is tolerating statin therapy     Diabetes Type 2: patient denies any low blood sugars. Patient is following with opthalmology on a yearly basis. 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 speech is clear and coherent  HEENT: Sclera and conjunctiva white, EOMI, uvela midline, no mouth lesions. PERRLA,      Respiratory : No respiratory distress, normal respiratory rhythm and effort. Clear bilateral breath sounds. No wheezing or rhonchi.   Cardiovascular: heart rate " Pt says for past five days he feels dizzy with movement, says [facility] anxiety and abscess removal 8 months ago   regular, S1, S2. no murmurs. no Lower extremity edema  Skin inspection: Normal skin color and pigmentation, normal skin turgor and no visible rash, induration, or cellulitis  MSK: 5/5 strength upper and lower extremities without gait abnormalities. no loss of muscle mass   Neuro: 2-12 CN grossly intact.  no slurred speech. no lateralizing deficit  Psychiatric Orientation: Oriented to person, place, and time. no depression, homicidal or suicidal thoughts, normal affect     REVIEW OF SYSTEMS   Constitutional: not feeling tired and no fever, chills or sweats. Denies weight loss    HEENT: no earache and no sore throat. no blurred vision and or double vision. no headache  Cardiovascular: no exertional chest pain, no palpitations, no lower extremity edema and no intermittent leg claudication.   Lungs: Denies shortness of breath, exertional dyspnea, wheezing  Gastrointestinal: no change in bowel habits, no diarrhea, no nausea, no vomiting and no abdominal pain. Denies Melena, brbpr or dark stool  Musculoskeletal: no myalgias, no muscle weakness and no limb swelling.   Skin: no rashes, no change in skin color and pigmentation and no skin lumps.   Neurological: no headaches, no seizures, no numbness, no lateralizing deficits and no fainting.   Psychiatric: no depression and no anxiety.   Urine: denies polyuria, hematuria, dysuria   Endocrine: no cold intolerance, no heat intolerance      No Known Allergies    Current Outpatient Medications   Medication Sig Dispense Refill    doxazosin (Cardura) 2 mg tablet Take 1 tablet (2 mg) by mouth once daily. 90 tablet 1    glipiZIDE XL (Glucotrol XL) 5 mg 24 hr tablet Take 1 tablet (5 mg) by mouth once daily with a meal. 90 tablet 3    lisinopril 40 mg tablet Take 1 tablet (40 mg) by mouth once daily. 90 tablet 3    metFORMIN (Glucophage) 1,000 mg tablet Take 1 tablet (1,000 mg) by mouth 2 times a day with meals. 180 tablet 1    metoprolol tartrate (Lopressor) 50 mg tablet 1/2 tab in  am 1 tab in pm 135 tablet 3    omeprazole (PriLOSEC) 40 mg DR capsule Take 1 capsule (40 mg) by mouth once daily. 90 capsule 3    rosuvastatin (Crestor) 10 mg tablet Take 1 tablet (10 mg) by mouth once daily. 90 tablet 1     No current facility-administered medications for this visit.       Objective     No visits with results within 4 Month(s) from this visit.   Latest known visit with results is:   Legacy Encounter on 12/21/2020   Component Date Value Ref Range Status    Glucose 12/21/2020 172 (H)  74 - 99 mg/dL Final    Sodium 12/21/2020 141  136 - 145 mmol/L Final    Potassium 12/21/2020 5.2  3.5 - 5.3 mmol/L Final    Chloride 12/21/2020 104  98 - 107 mmol/L Final    Bicarbonate 12/21/2020 25  21 - 32 mmol/L Final    Anion Gap 12/21/2020 17  10 - 20 mmol/L Final    Urea Nitrogen 12/21/2020 28 (H)  6 - 23 mg/dL Final    Creatinine 12/21/2020 1.65 (H)  0.50 - 1.30 mg/dL Final    GLOMERULAR FILTRATION RATE-NON AFR* 12/21/2020 42 (A)  >60 mL/min/1.73m2 Final    GLOMERULAR FILTRATION RATE-* 12/21/2020 51 (A)  >60 mL/min/1.73m2 Final    Calcium 12/21/2020 9.6  8.6 - 10.6 mg/dL Final       Radiology: Reviewed imaging in powerchart.  No results found.    No family history on file.  Social History     Socioeconomic History    Marital status:      Spouse name: None    Number of children: None    Years of education: None    Highest education level: None   Occupational History    None   Tobacco Use    Smoking status: Never    Smokeless tobacco: Never   Substance and Sexual Activity    Alcohol use: Yes     Comment: rarely    Drug use: Never    Sexual activity: None   Other Topics Concern    None   Social History Narrative    None     Social Determinants of Health     Financial Resource Strain: Not on file   Food Insecurity: Not on file   Transportation Needs: Not on file   Physical Activity: Not on file   Stress: Not on file   Social Connections: Not on file   Intimate Partner Violence: Not on file   Housing  Stability: Not on file     Past Medical History:   Diagnosis Date    Personal history of other endocrine, nutritional and metabolic disease 11/03/2013    History of hyperlipidemia    Personal history of other endocrine, nutritional and metabolic disease     History of diabetes mellitus    Personal history of urinary calculi     Personal history of renal calculi     Past Surgical History:   Procedure Laterality Date    LITHOTRIPSY  05/19/2014    Renal Lithotripsy    OTHER SURGICAL HISTORY  12/02/2019    Colonoscopy    OTHER SURGICAL HISTORY  05/19/2014    Surgery Scrotum       Charting was completed using voice recognition technology and may include unintended errors.

## 2025-01-13 ENCOUNTER — APPOINTMENT (OUTPATIENT)
Dept: PRIMARY CARE | Facility: CLINIC | Age: 65
End: 2025-01-13
Payer: MEDICARE

## 2025-01-13 VITALS
DIASTOLIC BLOOD PRESSURE: 89 MMHG | OXYGEN SATURATION: 94 % | SYSTOLIC BLOOD PRESSURE: 163 MMHG | BODY MASS INDEX: 35.42 KG/M2 | HEIGHT: 71 IN | WEIGHT: 253 LBS | HEART RATE: 80 BPM

## 2025-01-13 DIAGNOSIS — E66.01 OBESITY, MORBID (MULTI): ICD-10-CM

## 2025-01-13 DIAGNOSIS — E11.59 HYPERTENSION ASSOCIATED WITH DIABETES (MULTI): ICD-10-CM

## 2025-01-13 DIAGNOSIS — Z12.5 SPECIAL SCREENING, PROSTATE CANCER: ICD-10-CM

## 2025-01-13 DIAGNOSIS — I10 HYPERTENSION, UNSPECIFIED TYPE: ICD-10-CM

## 2025-01-13 DIAGNOSIS — E78.5 HYPERLIPIDEMIA, UNSPECIFIED HYPERLIPIDEMIA TYPE: ICD-10-CM

## 2025-01-13 DIAGNOSIS — E11.9 TYPE 2 DIABETES MELLITUS WITHOUT COMPLICATION, WITHOUT LONG-TERM CURRENT USE OF INSULIN (MULTI): ICD-10-CM

## 2025-01-13 DIAGNOSIS — Z00.00 WELLNESS EXAMINATION: Primary | ICD-10-CM

## 2025-01-13 DIAGNOSIS — I15.2 HYPERTENSION ASSOCIATED WITH DIABETES (MULTI): ICD-10-CM

## 2025-01-13 DIAGNOSIS — L40.9 PSORIASIS: ICD-10-CM

## 2025-01-13 DIAGNOSIS — N18.31 STAGE 3A CHRONIC KIDNEY DISEASE (MULTI): ICD-10-CM

## 2025-01-13 DIAGNOSIS — M54.32 SCIATICA OF LEFT SIDE: ICD-10-CM

## 2025-01-13 PROCEDURE — 1036F TOBACCO NON-USER: CPT | Performed by: INTERNAL MEDICINE

## 2025-01-13 PROCEDURE — 3079F DIAST BP 80-89 MM HG: CPT | Performed by: INTERNAL MEDICINE

## 2025-01-13 PROCEDURE — 1123F ACP DISCUSS/DSCN MKR DOCD: CPT | Performed by: INTERNAL MEDICINE

## 2025-01-13 PROCEDURE — 1159F MED LIST DOCD IN RCRD: CPT | Performed by: INTERNAL MEDICINE

## 2025-01-13 PROCEDURE — 1170F FXNL STATUS ASSESSED: CPT | Performed by: INTERNAL MEDICINE

## 2025-01-13 PROCEDURE — 99214 OFFICE O/P EST MOD 30 MIN: CPT | Performed by: INTERNAL MEDICINE

## 2025-01-13 PROCEDURE — 1160F RVW MEDS BY RX/DR IN RCRD: CPT | Performed by: INTERNAL MEDICINE

## 2025-01-13 PROCEDURE — G0439 PPPS, SUBSEQ VISIT: HCPCS | Performed by: INTERNAL MEDICINE

## 2025-01-13 PROCEDURE — 4010F ACE/ARB THERAPY RXD/TAKEN: CPT | Performed by: INTERNAL MEDICINE

## 2025-01-13 PROCEDURE — 3077F SYST BP >= 140 MM HG: CPT | Performed by: INTERNAL MEDICINE

## 2025-01-13 PROCEDURE — 3008F BODY MASS INDEX DOCD: CPT | Performed by: INTERNAL MEDICINE

## 2025-01-13 RX ORDER — PREDNISONE 5 MG/1
TABLET ORAL
Qty: 30 TABLET | Refills: 3 | Status: SHIPPED | OUTPATIENT
Start: 2025-01-13

## 2025-01-13 RX ORDER — DOXAZOSIN 4 MG/1
4 TABLET ORAL DAILY
Qty: 90 TABLET | Refills: 3 | Status: SHIPPED | OUTPATIENT
Start: 2025-01-13

## 2025-01-13 RX ORDER — NIACIN 50 MG
500 TABLET ORAL
COMMUNITY
Start: 2024-05-29

## 2025-01-13 RX ORDER — TRIAMCINOLONE ACETONIDE 1 MG/G
CREAM TOPICAL 2 TIMES DAILY
Qty: 90 G | Refills: 2 | Status: SHIPPED | OUTPATIENT
Start: 2025-01-13

## 2025-01-13 RX ORDER — ACETAMINOPHEN 500 MG
1000 TABLET ORAL EVERY 8 HOURS PRN
COMMUNITY
Start: 2024-06-13

## 2025-01-13 RX ORDER — GABAPENTIN 300 MG/1
300 CAPSULE ORAL 3 TIMES DAILY
Qty: 270 CAPSULE | Refills: 3 | Status: SHIPPED | OUTPATIENT
Start: 2025-01-13 | End: 2026-01-08

## 2025-01-13 ASSESSMENT — PATIENT HEALTH QUESTIONNAIRE - PHQ9
2. FEELING DOWN, DEPRESSED OR HOPELESS: NOT AT ALL
1. LITTLE INTEREST OR PLEASURE IN DOING THINGS: NOT AT ALL
SUM OF ALL RESPONSES TO PHQ9 QUESTIONS 1 AND 2: 0

## 2025-01-13 ASSESSMENT — ACTIVITIES OF DAILY LIVING (ADL)
DOING_HOUSEWORK: INDEPENDENT
GROCERY_SHOPPING: INDEPENDENT
BATHING: INDEPENDENT
DRESSING: INDEPENDENT
TAKING_MEDICATION: INDEPENDENT
MANAGING_FINANCES: INDEPENDENT

## 2025-01-13 NOTE — PROGRESS NOTES
Subjective   Patient ID: Lokesh Dietz is a 64 y.o. male who presents for the following  Chronic care management AND MEDICARE WELLNESS (1/13/25)  [facility]   Gamal's brother  Assessment/Plan   DJD lumbar spine/Left sided sciatica:   Prednisone taper 15mg x 5 days 10mg x 5 days 5mg x 5 dailys.  (Only to be used as needed. If patient is in 10/10 pain he should start off at 15mg, if 8/10 pain, if 5/10 pain he can start at 5mg   pain management, Dr Babb   200mg gabapentin tid --> 300mg tid (1/13/25)    HTN: stable,   Doxazosin 2mg  at goal of 130/80 --> 4mg 1/13/25  Continue on lisinopril 40mg daily   Continue on metoprolol 50mg bid    dm2: stable, a1c 6.5--> 6.9 9/2023 --> 7.1 --> 8.4 (9/24/24) --> 7.4 ((12/27/24)  Likely combination of intermittent oral steroid use and diet   glipizide 5mg XL daily (stop jardiance due price) and continue on metformin 1000mgbid.     Morbid obesity: stable, diet and weight loss     CKD stage 3: avoid nsaids, stay hydrated. Cr. 1.6 typically stable (once patient is on medicare consider farxiga)      HLD: LDL < 70. check lipid panel      Order Cologuard September 2021 negative,     Father had prostate cancer     PSA 0.47    HPI  male with type 2 diabetes, diabetic nephropathy, hx kidney stones followed by Dr. Spencer, hypertension, hyperlipidemia, nephrolithiasis, renal insufficiency comes for the following      Follow Up    Right lower extremity: with eczema but has improved with nystatin still sizeable appears to be my flaky then anything else at the moment     Left sciatica: patient has a large wallet in the back pocket. He says the pain shoots down his leg just above his knee. He denies any low back pain issues in the past or currently. Patient did well on medrol dose pack a week ago but his pain flaired up again. He says taht he felt great and went back to doing yard work again and now he s in a good amount of pain in the low back on the left (he points to SI joint)      HTN:  "patient denies any headaches, blurred vision or dizziness. patient denies any stroke like symptoms     HLD: Patient denies any muscle aches and is tolerating statin therapy     Diabetes Type 2: patient denies any low blood sugars. Patient is following with opthalmology on a yearly basis. 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 speech is clear and coherent  HEENT: Sclera and conjunctiva white, EOMI,     Respiratory : No respiratory distress, normal respiratory rhythm and effort. Clear bilateral breath sounds. No wheezing or rhonchi.   Cardiovascular: heart rate regular, S1, S2. no murmurs. no Lower extremity edema   MSK: 5/5 strength upper and lower extremities without gait abnormalities. no loss of muscle mass .   Neuro: 2-12 CN grossly intact.  no slurred speech. no lateralizing deficit     REVIEW OF SYSTEMS   Constitutional: not feeling tired and no fever, chills or sweats. no headache  Cardiovascular: no exertional chest pain, no palpitations, no lower extremity edema   Lungs: Denies shortness of breath, exertional dyspnea, wheezing  Gastrointestinal: no change in bowel habits, no diarrhea, no nausea,  Musculoskeletal: no myalgias, no muscle weakness and no limb swelling.   Neurological: no headaches, no seizures, no numbness, no lateralizing deficits and no fainting.   Psychiatric: no depression and no anxiety.   Urine: denies polyuria, hematuria, dysuria       No Known " Allergies    Current Outpatient Medications   Medication Sig Dispense Refill    acetaminophen (Tylenol Extra Strength) 500 mg tablet Take 2 tablets (1,000 mg) by mouth every 8 hours if needed.      aspirin 81 mg EC tablet Take 1 tablet (81 mg) by mouth if needed.      doxazosin (Cardura) 2 mg tablet Take 1 tablet (2 mg) by mouth once daily. 90 tablet 3    gabapentin (Neurontin) 100 mg capsule Take 1 capsule (100 mg) by mouth 3 times a day as needed (pain). (Patient taking differently: Take 3 capsules (300 mg) by mouth 3 times a day.) 90 capsule 1    glipiZIDE XL (Glucotrol XL) 5 mg 24 hr tablet Take 1 tablet (5 mg) by mouth once daily with breakfast. 90 tablet 3    lisinopril 40 mg tablet Take 1 tablet (40 mg) by mouth once daily. 90 tablet 3    metFORMIN (Glucophage) 1,000 mg tablet Take 1 tablet (1,000 mg) by mouth 2 times a day with meals. 180 tablet 3    metoprolol tartrate (Lopressor) 50 mg tablet 1/2 tab in am 1 tab in pm 135 tablet 3    niacin 50 mg tablet 10 tablets (500 mg).      nystatin (Mycostatin) 100,000 unit/gram powder Apply 1 Application topically 2 times a day. 30 g 2    nystatin-triamcinolone (Mycolog II) cream Apply topically 2 times a day. Apply to affect area twice a day for 7-14 days then as needed for rash. 60 g 1    omeprazole (PriLOSEC) 40 mg DR capsule Take 1 capsule (40 mg) by mouth once daily. 90 capsule 3    predniSONE (Deltasone) 5 mg tablet 15MG for 5 days 10mg for 5 days 5mg for 5 days 30 tablet 3    rosuvastatin (Crestor) 10 mg tablet Take 1 tablet (10 mg) by mouth once daily. 90 tablet 3    fluconazole (Diflucan) 150 mg tablet Take 1 tablet (150 mg) by mouth 1 (one) time per week. (Patient not taking: Reported on 1/13/2025) 4 tablet 0     No current facility-administered medications for this visit.       Objective     Office Visit on 09/30/2024   Component Date Value Ref Range Status    NONINV COLON CA DNA+OCC BLD SCRN S* 10/17/2024 Negative  Negative Final       Radiology:  Reviewed imaging in powerchart.  No results found.    No family history on file.  Social History     Socioeconomic History    Marital status:    Tobacco Use    Smoking status: Never    Smokeless tobacco: Never   Substance and Sexual Activity    Alcohol use: Yes     Comment: rarely    Drug use: Never     Past Medical History:   Diagnosis Date    Personal history of other endocrine, nutritional and metabolic disease 11/03/2013    History of hyperlipidemia    Personal history of other endocrine, nutritional and metabolic disease     History of diabetes mellitus    Personal history of urinary calculi     Personal history of renal calculi     Past Surgical History:   Procedure Laterality Date    LITHOTRIPSY  05/19/2014    Renal Lithotripsy    OTHER SURGICAL HISTORY  12/02/2019    Colonoscopy    OTHER SURGICAL HISTORY  05/19/2014    Surgery Scrotum       Charting was completed using voice recognition technology and may include unintended errors.

## 2025-01-14 ENCOUNTER — TELEPHONE (OUTPATIENT)
Dept: PRIMARY CARE | Facility: CLINIC | Age: 65
End: 2025-01-14
Payer: MEDICARE

## 2025-03-21 ENCOUNTER — TELEPHONE (OUTPATIENT)
Dept: PRIMARY CARE | Facility: CLINIC | Age: 65
End: 2025-03-21
Payer: MEDICARE

## 2025-03-21 DIAGNOSIS — E11.9 TYPE 2 DIABETES MELLITUS WITHOUT COMPLICATION, WITHOUT LONG-TERM CURRENT USE OF INSULIN (MULTI): ICD-10-CM

## 2025-03-21 DIAGNOSIS — E78.5 HYPERLIPIDEMIA, UNSPECIFIED HYPERLIPIDEMIA TYPE: ICD-10-CM

## 2025-03-21 RX ORDER — METFORMIN HYDROCHLORIDE 1000 MG/1
1000 TABLET ORAL
Qty: 180 TABLET | Refills: 3 | Status: SHIPPED | OUTPATIENT
Start: 2025-03-21

## 2025-03-21 RX ORDER — ROSUVASTATIN CALCIUM 10 MG/1
10 TABLET, COATED ORAL DAILY
Qty: 90 TABLET | Refills: 3 | Status: SHIPPED | OUTPATIENT
Start: 2025-03-21

## 2025-04-29 ENCOUNTER — TELEPHONE (OUTPATIENT)
Dept: PRIMARY CARE | Facility: CLINIC | Age: 65
End: 2025-04-29

## 2025-05-10 LAB
25(OH)D3+25(OH)D2 SERPL-MCNC: 39 NG/ML (ref 30–100)
ALBUMIN SERPL-MCNC: 4.7 G/DL (ref 3.6–5.1)
ALP SERPL-CCNC: 39 U/L (ref 35–144)
ALT SERPL-CCNC: 15 U/L (ref 9–46)
ANION GAP SERPL CALCULATED.4IONS-SCNC: 11 MMOL/L (CALC) (ref 7–17)
AST SERPL-CCNC: 21 U/L (ref 10–35)
BILIRUB SERPL-MCNC: 0.7 MG/DL (ref 0.2–1.2)
BUN SERPL-MCNC: 24 MG/DL (ref 7–25)
CALCIUM SERPL-MCNC: 8.6 MG/DL (ref 8.6–10.3)
CHLORIDE SERPL-SCNC: 104 MMOL/L (ref 98–110)
CHOLEST SERPL-MCNC: 112 MG/DL
CHOLEST/HDLC SERPL: 2.9 (CALC)
CO2 SERPL-SCNC: 24 MMOL/L (ref 20–32)
CREAT SERPL-MCNC: 1.43 MG/DL (ref 0.7–1.35)
EGFRCR SERPLBLD CKD-EPI 2021: 54 ML/MIN/1.73M2
ERYTHROCYTE [DISTWIDTH] IN BLOOD BY AUTOMATED COUNT: 12.4 % (ref 11–15)
EST. AVERAGE GLUCOSE BLD GHB EST-MCNC: 169 MG/DL
EST. AVERAGE GLUCOSE BLD GHB EST-SCNC: 9.3 MMOL/L
GLUCOSE SERPL-MCNC: 175 MG/DL (ref 65–99)
HBA1C MFR BLD: 7.5 %
HCT VFR BLD AUTO: 39 % (ref 38.5–50)
HDLC SERPL-MCNC: 38 MG/DL
HGB BLD-MCNC: 13.3 G/DL (ref 13.2–17.1)
LDLC SERPL CALC-MCNC: 53 MG/DL (CALC)
MCH RBC QN AUTO: 33.5 PG (ref 27–33)
MCHC RBC AUTO-ENTMCNC: 34.1 G/DL (ref 32–36)
MCV RBC AUTO: 98.2 FL (ref 80–100)
NONHDLC SERPL-MCNC: 74 MG/DL (CALC)
PLATELET # BLD AUTO: 165 THOUSAND/UL (ref 140–400)
PMV BLD REES-ECKER: 9.9 FL (ref 7.5–12.5)
POTASSIUM SERPL-SCNC: 4.6 MMOL/L (ref 3.5–5.3)
PROT SERPL-MCNC: 6.7 G/DL (ref 6.1–8.1)
PSA SERPL-MCNC: 0.42 NG/ML
RBC # BLD AUTO: 3.97 MILLION/UL (ref 4.2–5.8)
SODIUM SERPL-SCNC: 139 MMOL/L (ref 135–146)
TRIGL SERPL-MCNC: 133 MG/DL
TSH SERPL-ACNC: 3.54 MIU/L (ref 0.4–4.5)
WBC # BLD AUTO: 4.4 THOUSAND/UL (ref 3.8–10.8)

## 2025-05-16 ENCOUNTER — APPOINTMENT (OUTPATIENT)
Dept: PRIMARY CARE | Facility: CLINIC | Age: 65
End: 2025-05-16
Payer: MEDICARE

## 2025-05-16 VITALS
SYSTOLIC BLOOD PRESSURE: 171 MMHG | BODY MASS INDEX: 36.26 KG/M2 | HEIGHT: 71 IN | OXYGEN SATURATION: 96 % | WEIGHT: 259 LBS | DIASTOLIC BLOOD PRESSURE: 94 MMHG | HEART RATE: 66 BPM

## 2025-05-16 DIAGNOSIS — M17.11 PRIMARY OSTEOARTHRITIS OF RIGHT KNEE: ICD-10-CM

## 2025-05-16 DIAGNOSIS — N18.30 STAGE 3 CHRONIC KIDNEY DISEASE, UNSPECIFIED WHETHER STAGE 3A OR 3B CKD (MULTI): ICD-10-CM

## 2025-05-16 DIAGNOSIS — E11.9 TYPE 2 DIABETES MELLITUS WITHOUT COMPLICATION, WITHOUT LONG-TERM CURRENT USE OF INSULIN: ICD-10-CM

## 2025-05-16 DIAGNOSIS — M17.12 PRIMARY OSTEOARTHRITIS OF LEFT KNEE: Primary | ICD-10-CM

## 2025-05-16 PROCEDURE — 1036F TOBACCO NON-USER: CPT | Performed by: INTERNAL MEDICINE

## 2025-05-16 PROCEDURE — 3077F SYST BP >= 140 MM HG: CPT | Performed by: INTERNAL MEDICINE

## 2025-05-16 PROCEDURE — 1159F MED LIST DOCD IN RCRD: CPT | Performed by: INTERNAL MEDICINE

## 2025-05-16 PROCEDURE — 3008F BODY MASS INDEX DOCD: CPT | Performed by: INTERNAL MEDICINE

## 2025-05-16 PROCEDURE — 99214 OFFICE O/P EST MOD 30 MIN: CPT | Performed by: INTERNAL MEDICINE

## 2025-05-16 PROCEDURE — G2211 COMPLEX E/M VISIT ADD ON: HCPCS | Performed by: INTERNAL MEDICINE

## 2025-05-16 PROCEDURE — 4010F ACE/ARB THERAPY RXD/TAKEN: CPT | Performed by: INTERNAL MEDICINE

## 2025-05-16 PROCEDURE — 3080F DIAST BP >= 90 MM HG: CPT | Performed by: INTERNAL MEDICINE

## 2025-05-16 RX ORDER — TRIAMCINOLONE ACETONIDE 40 MG/ML
80 INJECTION, SUSPENSION INTRA-ARTICULAR; INTRAMUSCULAR ONCE
Status: COMPLETED | OUTPATIENT
Start: 2025-05-16 | End: 2025-05-16

## 2025-05-16 RX ADMIN — TRIAMCINOLONE ACETONIDE 80 MG: 40 INJECTION, SUSPENSION INTRA-ARTICULAR; INTRAMUSCULAR at 11:52

## 2025-05-16 NOTE — PROGRESS NOTES
Subjective   Patient ID: Lokesh Dietz is a 64 y.o. male who presents for the following  Chronic care management AND MEDICARE WELLNESS (1/13/25)  Goes Roopa Yeung's brother  Assessment/Plan   DJD lumbar spine/Left sided sciatica:   Prednisone taper 15mg x 5 days 10mg x 5 days 5mg x 5 dailys.  (Only to be used as needed. If patient is in 10/10 pain he should start off at 15mg, if 8/10 pain, if 5/10 pain he can start at 5mg   pain management, Dr Babb   200mg gabapentin tid --> 300mg tid (1/13/25)    HTN: unstable, patient says he was nervous for the knee injections  Doxazosin 2mg  at goal of 130/80 --> 4mg 1/13/25  Continue on lisinopril 40mg daily   Continue on metoprolol 50mg bid    dm2: stable, a1c 6.5--> 6.9 9/2023 --> 7.1 --> 8.4 (9/24/24) --> 7.4 ((12/27/24) -->7.5 (may 2025)  Likely combination of intermittent oral steroid use and diet   glipizide 5mg XL daily (stop jardiance due price) and continue on metformin 1000mgbid.     Morbid obesity: stable, diet and weight loss     CKD stage 3: avoid nsaids, stay hydrated. Cr. 1.6 typically stable (once patient is on medicare consider farxiga)      HLD: LDL < 70. check lipid panel     Bilateral knee OA:  5/16/25  Patients    right       knee is prepared with a swab of alcohol and then Betadine swab twice. under sterile procedure using a 22 G syringe, 1ml of 2% xylocaine and 80mg of Kenelog was successfully injected into the knee compartment using a lateral approach. there was no bleeding of discomfort during or after the procedure. no complications have occurred.    5/16/25  Patients   left        knee is prepared with a swab of alcohol and then Betadine swab twice. under sterile procedure using a 22 G syringe, 1ml of 2% xylocaine and 80mg of Kenelog was successfully injected into the knee compartment using a lateral approach. there was no bleeding of discomfort during or after the procedure. no complications have occurred.       Order Lenin September 2021  "negative,     Father had prostate cancer     PSA 0.47    HPI  male with type 2 diabetes, diabetic nephropathy, hx kidney stones followed by Dr. Spencer, hypertension, hyperlipidemia, nephrolithiasis, renal insufficiency comes for the following      Follow Up    Right lower extremity: with eczema but has improved with nystatin still sizeable appears to be my flaky then anything else at the moment     Left sciatica: patient has a large wallet in the back pocket. He says the pain shoots down his leg just above his knee. He denies any low back pain issues in the past or currently. Patient did well on medrol dose pack a week ago but his pain flaired up again. He says taht he felt great and went back to doing yard work again and now he s in a good amount of pain in the low back on the left (he points to SI joint)      HTN: patient denies any headaches, blurred vision or dizziness. patient denies any stroke like symptoms     HLD: Patient denies any muscle aches and is tolerating statin therapy     Diabetes Type 2: patient denies any low blood sugars. Patient is following with opthalmology on a yearly basis. 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 speech is clear and coherent  HEENT: Sclera and conjunctiva white, EOMI,     Respiratory : No respiratory distress, normal respiratory rhythm and effort. Clear bilateral breath sounds. No wheezing or rhonchi.   Cardiovascular: heart " rate regular, S1, S2. no murmurs. no Lower extremity edema   MSK: 5/5 strength upper and lower extremities without gait abnormalities. no loss of muscle mass .   Neuro: 2-12 CN grossly intact.  no slurred speech. no lateralizing deficit     REVIEW OF SYSTEMS   Constitutional: not feeling tired and no fever, chills or sweats. no headache  Cardiovascular: no exertional chest pain, no palpitations, no lower extremity edema   Lungs: Denies shortness of breath, exertional dyspnea, wheezing  Gastrointestinal: no change in bowel habits, no diarrhea, no nausea,  Musculoskeletal: no myalgias, no muscle weakness and no limb swelling.   Neurological: no headaches, no seizures, no numbness, no lateralizing deficits and no fainting.   Psychiatric: no depression and no anxiety.   Urine: denies polyuria, hematuria, dysuria       No Known Allergies    Current Outpatient Medications   Medication Sig Dispense Refill    acetaminophen (Tylenol Extra Strength) 500 mg tablet Take 2 tablets (1,000 mg) by mouth every 8 hours if needed. (Patient taking differently: Take 2 tablets (1,000 mg) by mouth every 8 hours if needed. Only when needed)      aspirin 81 mg EC tablet Take 1 tablet (81 mg) by mouth if needed.      doxazosin (Cardura) 4 mg tablet Take 1 tablet (4 mg) by mouth once daily. 90 tablet 3    gabapentin (Neurontin) 300 mg capsule Take 1 capsule (300 mg) by mouth 3 times a day. 270 capsule 3    glipiZIDE XL (Glucotrol XL) 5 mg 24 hr tablet Take 1 tablet (5 mg) by mouth once daily with breakfast. 90 tablet 3    lisinopril 40 mg tablet Take 1 tablet (40 mg) by mouth once daily. 90 tablet 3    metFORMIN (Glucophage) 1,000 mg tablet Take 1 tablet (1,000 mg) by mouth 2 times daily (morning and late afternoon). 180 tablet 3    metoprolol tartrate (Lopressor) 50 mg tablet 1/2 tab in am 1 tab in pm 135 tablet 3    niacin 50 mg tablet 10 tablets (500 mg).      nystatin (Mycostatin) 100,000 unit/gram powder Apply 1 Application topically 2  times a day. 30 g 2    nystatin-triamcinolone (Mycolog II) cream Apply topically 2 times a day. Apply to affect area twice a day for 7-14 days then as needed for rash. 60 g 1    omeprazole (PriLOSEC) 40 mg DR capsule Take 1 capsule (40 mg) by mouth once daily. 90 capsule 3    predniSONE (Deltasone) 5 mg tablet 15MG for 5 days 10mg for 5 days 5mg for 5 days 30 tablet 3    rosuvastatin (Crestor) 10 mg tablet Take 1 tablet (10 mg) by mouth once daily. 90 tablet 3    triamcinolone (Kenalog) 0.1 % cream Apply topically 2 times a day. Apply to affected area 1-2 times daily as needed. Avoid face and groin. 90 g 2    fluconazole (Diflucan) 150 mg tablet Take 1 tablet (150 mg) by mouth 1 (one) time per week. (Patient not taking: Reported on 5/16/2025) 4 tablet 0     No current facility-administered medications for this visit.       Objective     Orders Only on 05/09/2025   Component Date Value Ref Range Status    CHOLESTEROL, TOTAL 05/09/2025 112  <200 mg/dL Final    HDL CHOLESTEROL 05/09/2025 38 (L)  > OR = 40 mg/dL Final    TRIGLYCERIDES 05/09/2025 133  <150 mg/dL Final    LDL-CHOLESTEROL 05/09/2025 53  mg/dL (calc) Final    CHOL/HDLC RATIO 05/09/2025 2.9  <5.0 (calc) Final    NON HDL CHOLESTEROL 05/09/2025 74  <130 mg/dL (calc) Final    GLUCOSE 05/09/2025 175 (H)  65 - 99 mg/dL Final    UREA NITROGEN (BUN) 05/09/2025 24  7 - 25 mg/dL Final    CREATININE 05/09/2025 1.43 (H)  0.70 - 1.35 mg/dL Final    EGFR 05/09/2025 54 (L)  > OR = 60 mL/min/1.73m2 Final    SODIUM 05/09/2025 139  135 - 146 mmol/L Final    POTASSIUM 05/09/2025 4.6  3.5 - 5.3 mmol/L Final    CHLORIDE 05/09/2025 104  98 - 110 mmol/L Final    CARBON DIOXIDE 05/09/2025 24  20 - 32 mmol/L Final    ELECTROLYTE BALANCE 05/09/2025 11  7 - 17 mmol/L (calc) Final    CALCIUM 05/09/2025 8.6  8.6 - 10.3 mg/dL Final    PROTEIN, TOTAL 05/09/2025 6.7  6.1 - 8.1 g/dL Final    ALBUMIN 05/09/2025 4.7  3.6 - 5.1 g/dL Final    BILIRUBIN, TOTAL 05/09/2025 0.7  0.2 - 1.2 mg/dL  Final    ALKALINE PHOSPHATASE 05/09/2025 39  35 - 144 U/L Final    AST 05/09/2025 21  10 - 35 U/L Final    ALT 05/09/2025 15  9 - 46 U/L Final    WHITE BLOOD CELL COUNT 05/09/2025 4.4  3.8 - 10.8 Thousand/uL Final    RED BLOOD CELL COUNT 05/09/2025 3.97 (L)  4.20 - 5.80 Million/uL Final    HEMOGLOBIN 05/09/2025 13.3  13.2 - 17.1 g/dL Final    HEMATOCRIT 05/09/2025 39.0  38.5 - 50.0 % Final    MCV 05/09/2025 98.2  80.0 - 100.0 fL Final    MCH 05/09/2025 33.5 (H)  27.0 - 33.0 pg Final    MCHC 05/09/2025 34.1  32.0 - 36.0 g/dL Final    RDW 05/09/2025 12.4  11.0 - 15.0 % Final    PLATELET COUNT 05/09/2025 165  140 - 400 Thousand/uL Final    MPV 05/09/2025 9.9  7.5 - 12.5 fL Final    PSA, TOTAL 05/09/2025 0.42  < OR = 4.00 ng/mL Final    TSH W/REFLEX TO FT4 05/09/2025 3.54  0.40 - 4.50 mIU/L Final    VITAMIN D,25-OH,TOTAL,IA 05/09/2025 39  30 - 100 ng/mL Final    HEMOGLOBIN A1c 05/09/2025 7.5 (H)  <5.7 % Final    eAG (mg/dL) 05/09/2025 169  mg/dL Final    eAG (mmol/L) 05/09/2025 9.3  mmol/L Final       Radiology: Reviewed imaging in powerchart.  No results found.    No family history on file.  Social History     Socioeconomic History    Marital status:    Tobacco Use    Smoking status: Never    Smokeless tobacco: Never   Substance and Sexual Activity    Alcohol use: Not Currently     Comment: rarely    Drug use: Never     Past Medical History:   Diagnosis Date    Personal history of other endocrine, nutritional and metabolic disease 11/03/2013    History of hyperlipidemia    Personal history of other endocrine, nutritional and metabolic disease     History of diabetes mellitus    Personal history of urinary calculi     Personal history of renal calculi     Past Surgical History:   Procedure Laterality Date    LITHOTRIPSY  05/19/2014    Renal Lithotripsy    OTHER SURGICAL HISTORY  12/02/2019    Colonoscopy    OTHER SURGICAL HISTORY  05/19/2014    Surgery Scrotum       Charting was completed using voice recognition  technology and may include unintended errors.

## 2025-08-19 LAB
ALBUMIN/CREAT UR: 13 MG/G CREAT
ANION GAP SERPL CALCULATED.4IONS-SCNC: 11 MMOL/L (CALC) (ref 7–17)
BUN SERPL-MCNC: 45 MG/DL (ref 7–25)
BUN/CREAT SERPL: 24 (CALC) (ref 6–22)
CALCIUM SERPL-MCNC: 6.4 MG/DL (ref 8.6–10.3)
CHLORIDE SERPL-SCNC: 107 MMOL/L (ref 98–110)
CO2 SERPL-SCNC: 23 MMOL/L (ref 20–32)
CREAT SERPL-MCNC: 1.9 MG/DL (ref 0.7–1.35)
CREAT UR-MCNC: 89 MG/DL (ref 20–320)
EGFRCR SERPLBLD CKD-EPI 2021: 38 ML/MIN/1.73M2
ERYTHROCYTE [DISTWIDTH] IN BLOOD BY AUTOMATED COUNT: 13 % (ref 11–15)
EST. AVERAGE GLUCOSE BLD GHB EST-MCNC: 154 MG/DL
EST. AVERAGE GLUCOSE BLD GHB EST-SCNC: 8.5 MMOL/L
GLUCOSE SERPL-MCNC: 157 MG/DL (ref 65–99)
HBA1C MFR BLD: 7 %
HCT VFR BLD AUTO: 35.5 % (ref 38.5–50)
HGB BLD-MCNC: 11.4 G/DL (ref 13.2–17.1)
MCH RBC QN AUTO: 32.9 PG (ref 27–33)
MCHC RBC AUTO-ENTMCNC: 32.1 G/DL (ref 32–36)
MCV RBC AUTO: 102.3 FL (ref 80–100)
MICROALBUMIN UR-MCNC: 1.2 MG/DL
PLATELET # BLD AUTO: 151 THOUSAND/UL (ref 140–400)
PMV BLD REES-ECKER: 9.4 FL (ref 7.5–12.5)
POTASSIUM SERPL-SCNC: 5 MMOL/L (ref 3.5–5.3)
RBC # BLD AUTO: 3.47 MILLION/UL (ref 4.2–5.8)
SODIUM SERPL-SCNC: 141 MMOL/L (ref 135–146)
WBC # BLD AUTO: 4.3 THOUSAND/UL (ref 3.8–10.8)

## 2025-08-20 DIAGNOSIS — D64.9 ANEMIA, UNSPECIFIED TYPE: Primary | ICD-10-CM

## 2025-08-20 DIAGNOSIS — N18.30 STAGE 3 CHRONIC KIDNEY DISEASE, UNSPECIFIED WHETHER STAGE 3A OR 3B CKD (MULTI): ICD-10-CM

## 2025-08-29 ENCOUNTER — APPOINTMENT (OUTPATIENT)
Dept: PRIMARY CARE | Facility: CLINIC | Age: 65
End: 2025-08-29
Payer: MEDICARE

## 2025-08-29 VITALS
HEART RATE: 73 BPM | BODY MASS INDEX: 35.56 KG/M2 | OXYGEN SATURATION: 95 % | WEIGHT: 254 LBS | SYSTOLIC BLOOD PRESSURE: 146 MMHG | HEIGHT: 71 IN | DIASTOLIC BLOOD PRESSURE: 91 MMHG

## 2025-08-29 DIAGNOSIS — D64.9 ANEMIA, UNSPECIFIED TYPE: ICD-10-CM

## 2025-08-29 DIAGNOSIS — N17.9 AKI (ACUTE KIDNEY INJURY): Primary | ICD-10-CM

## 2025-08-29 DIAGNOSIS — L40.9 PSORIASIS: ICD-10-CM

## 2025-08-29 PROCEDURE — 3080F DIAST BP >= 90 MM HG: CPT | Performed by: INTERNAL MEDICINE

## 2025-08-29 PROCEDURE — G2211 COMPLEX E/M VISIT ADD ON: HCPCS | Performed by: INTERNAL MEDICINE

## 2025-08-29 PROCEDURE — 99214 OFFICE O/P EST MOD 30 MIN: CPT | Performed by: INTERNAL MEDICINE

## 2025-08-29 PROCEDURE — 1159F MED LIST DOCD IN RCRD: CPT | Performed by: INTERNAL MEDICINE

## 2025-08-29 PROCEDURE — 3077F SYST BP >= 140 MM HG: CPT | Performed by: INTERNAL MEDICINE

## 2025-08-29 PROCEDURE — 4010F ACE/ARB THERAPY RXD/TAKEN: CPT | Performed by: INTERNAL MEDICINE

## 2025-08-29 PROCEDURE — 3008F BODY MASS INDEX DOCD: CPT | Performed by: INTERNAL MEDICINE

## 2025-08-29 PROCEDURE — 1036F TOBACCO NON-USER: CPT | Performed by: INTERNAL MEDICINE

## 2025-12-01 ENCOUNTER — APPOINTMENT (OUTPATIENT)
Dept: PRIMARY CARE | Facility: CLINIC | Age: 65
End: 2025-12-01
Payer: MEDICARE